# Patient Record
Sex: FEMALE | Race: WHITE | NOT HISPANIC OR LATINO | Employment: FULL TIME | URBAN - METROPOLITAN AREA
[De-identification: names, ages, dates, MRNs, and addresses within clinical notes are randomized per-mention and may not be internally consistent; named-entity substitution may affect disease eponyms.]

---

## 2022-11-11 ENCOUNTER — TELEPHONE (OUTPATIENT)
Dept: GASTROENTEROLOGY | Facility: CLINIC | Age: 42
End: 2022-11-11
Payer: COMMERCIAL

## 2022-11-11 NOTE — TELEPHONE ENCOUNTER
Called patient to let know that the upcoming appointment scheduled with our Nurse Practioner for ulcerative colitis, Crohn's, she is not able to treat, [facility] specialist Dr Gonsalves is presently not able to take new patients, so I left message to refer her to [facility]/ Dr Toribio. Number provided.

## 2022-11-18 ENCOUNTER — TELEPHONE (OUTPATIENT)
Dept: ENDOSCOPY | Facility: HOSPITAL | Age: 42
End: 2022-11-18
Payer: COMMERCIAL

## 2022-11-18 NOTE — TELEPHONE ENCOUNTER
Pt calling to scheduled an appointment with IBD for UC and wants a call back   Pt called ENDO scheduling department   Please call pt

## 2023-09-07 ENCOUNTER — TELEPHONE (OUTPATIENT)
Dept: OBSTETRICS AND GYNECOLOGY | Facility: CLINIC | Age: 43
End: 2023-09-07
Payer: COMMERCIAL

## 2023-09-07 DIAGNOSIS — Z12.31 ENCOUNTER FOR SCREENING MAMMOGRAM FOR MALIGNANT NEOPLASM OF BREAST: Primary | ICD-10-CM

## 2023-09-07 NOTE — TELEPHONE ENCOUNTER
Called patient and she is booked to see LL on 9/21.  Mammo ordered but could not schedule 9/21.  Patient will call back to schedule mammo.

## 2023-09-07 NOTE — TELEPHONE ENCOUNTER
----- Message from Padmini Hooker sent at 9/7/2023  8:42 AM CDT -----  Type:  Mammogram    Caller is requesting to schedule their annual mammogram appointment.  Order is not listed in EPIC.  Please enter order and contact patient to schedule.  Name of Caller:Portal Message --- New Patient   Where would they like the mammogram performed?  Would the patient rather a call back or a response via [facility]? Call back   Best Call Back Number:[phone]  Additional Information:       Message  Appointment Request From: Jeannie Dinero  With Provider: Mammogram  Preferred Date Range: Any date 9/21/2023 or later  Preferred Times: Any Time  Reason for visit: Annual mammogram needed    Comments:  Annual mammogram performed

## 2023-09-21 ENCOUNTER — OFFICE VISIT (OUTPATIENT)
Dept: OBSTETRICS AND GYNECOLOGY | Facility: CLINIC | Age: 43
End: 2023-09-21
Payer: COMMERCIAL

## 2023-09-21 ENCOUNTER — HOSPITAL ENCOUNTER (OUTPATIENT)
Dept: RADIOLOGY | Facility: HOSPITAL | Age: 43
Discharge: HOME | End: 2023-09-21
Attending: OBSTETRICS & GYNECOLOGY
Payer: COMMERCIAL

## 2023-09-21 VITALS
SYSTOLIC BLOOD PRESSURE: 116 MMHG | DIASTOLIC BLOOD PRESSURE: 86 MMHG | WEIGHT: 201.63 LBS | BODY MASS INDEX: 34.42 KG/M2 | HEIGHT: 64 IN

## 2023-09-21 DIAGNOSIS — N92.0 MENORRHAGIA WITH REGULAR CYCLE: ICD-10-CM

## 2023-09-21 DIAGNOSIS — Z12.4 SCREENING FOR CERVICAL CANCER: ICD-10-CM

## 2023-09-21 DIAGNOSIS — Z12.31 ENCOUNTER FOR SCREENING MAMMOGRAM FOR MALIGNANT NEOPLASM OF BREAST: ICD-10-CM

## 2023-09-21 DIAGNOSIS — Z12.31 SCREENING MAMMOGRAM, ENCOUNTER FOR: ICD-10-CM

## 2023-09-21 DIAGNOSIS — Z01.419 ENCOUNTER FOR GYNECOLOGICAL EXAMINATION (GENERAL) (ROUTINE) WITHOUT ABNORMAL FINDINGS: Primary | ICD-10-CM

## 2023-09-21 DIAGNOSIS — N94.6 DYSMENORRHEA: ICD-10-CM

## 2023-09-21 PROCEDURE — 3074F PR MOST RECENT SYSTOLIC BLOOD PRESSURE < 130 MM HG: ICD-10-PCS | Mod: CPTII,S$GLB,, | Performed by: OBSTETRICS & GYNECOLOGY

## 2023-09-21 PROCEDURE — 99999 PR PBB SHADOW E&M-EST. PATIENT-LVL III: ICD-10-PCS | Mod: PBBFAC,,, | Performed by: OBSTETRICS & GYNECOLOGY

## 2023-09-21 PROCEDURE — 4010F ACE/ARB THERAPY RXD/TAKEN: CPT | Mod: CPTII,S$GLB,, | Performed by: OBSTETRICS & GYNECOLOGY

## 2023-09-21 PROCEDURE — 77067 SCR MAMMO BI INCL CAD: CPT | Mod: 26,,, | Performed by: RADIOLOGY

## 2023-09-21 PROCEDURE — 3008F BODY MASS INDEX DOCD: CPT | Mod: CPTII,S$GLB,, | Performed by: OBSTETRICS & GYNECOLOGY

## 2023-09-21 PROCEDURE — 77063 MAMMO DIGITAL SCREENING BILAT WITH TOMO: ICD-10-PCS | Mod: 26,,, | Performed by: RADIOLOGY

## 2023-09-21 PROCEDURE — 76830 US PELVIS COMP WITH TRANSVAG NON-OB (XPD): ICD-10-PCS | Mod: 26,,, | Performed by: RADIOLOGY

## 2023-09-21 PROCEDURE — 3008F PR BODY MASS INDEX (BMI) DOCUMENTED: ICD-10-PCS | Mod: CPTII,S$GLB,, | Performed by: OBSTETRICS & GYNECOLOGY

## 2023-09-21 PROCEDURE — 87624 HPV HI-RISK TYP POOLED RSLT: CPT | Performed by: OBSTETRICS & GYNECOLOGY

## 2023-09-21 PROCEDURE — 1159F MED LIST DOCD IN RCRD: CPT | Mod: CPTII,S$GLB,, | Performed by: OBSTETRICS & GYNECOLOGY

## 2023-09-21 PROCEDURE — 99999 PR PBB SHADOW E&M-EST. PATIENT-LVL III: CPT | Mod: PBBFAC,,, | Performed by: OBSTETRICS & GYNECOLOGY

## 2023-09-21 PROCEDURE — 76856 US EXAM PELVIC COMPLETE: CPT | Mod: 26,,, | Performed by: RADIOLOGY

## 2023-09-21 PROCEDURE — 76856 US PELVIS COMP WITH TRANSVAG NON-OB (XPD): ICD-10-PCS | Mod: 26,,, | Performed by: RADIOLOGY

## 2023-09-21 PROCEDURE — 88175 CYTOPATH C/V AUTO FLUID REDO: CPT | Performed by: OBSTETRICS & GYNECOLOGY

## 2023-09-21 PROCEDURE — 76830 TRANSVAGINAL US NON-OB: CPT | Mod: TC,PO

## 2023-09-21 PROCEDURE — 3079F DIAST BP 80-89 MM HG: CPT | Mod: CPTII,S$GLB,, | Performed by: OBSTETRICS & GYNECOLOGY

## 2023-09-21 PROCEDURE — 99396 PR PREVENTIVE VISIT,EST,40-64: ICD-10-PCS | Mod: S$GLB,,, | Performed by: OBSTETRICS & GYNECOLOGY

## 2023-09-21 PROCEDURE — 3074F SYST BP LT 130 MM HG: CPT | Mod: CPTII,S$GLB,, | Performed by: OBSTETRICS & GYNECOLOGY

## 2023-09-21 PROCEDURE — 76830 TRANSVAGINAL US NON-OB: CPT | Mod: 26,,, | Performed by: RADIOLOGY

## 2023-09-21 PROCEDURE — 1159F PR MEDICATION LIST DOCUMENTED IN MEDICAL RECORD: ICD-10-PCS | Mod: CPTII,S$GLB,, | Performed by: OBSTETRICS & GYNECOLOGY

## 2023-09-21 PROCEDURE — 99396 PREV VISIT EST AGE 40-64: CPT | Mod: S$GLB,,, | Performed by: OBSTETRICS & GYNECOLOGY

## 2023-09-21 PROCEDURE — 77067 MAMMO DIGITAL SCREENING BILAT WITH TOMO: ICD-10-PCS | Mod: 26,,, | Performed by: RADIOLOGY

## 2023-09-21 PROCEDURE — 77067 SCR MAMMO BI INCL CAD: CPT | Mod: TC,PO

## 2023-09-21 PROCEDURE — 3079F PR MOST RECENT DIASTOLIC BLOOD PRESSURE 80-89 MM HG: ICD-10-PCS | Mod: CPTII,S$GLB,, | Performed by: OBSTETRICS & GYNECOLOGY

## 2023-09-21 PROCEDURE — 4010F PR ACE/ARB THEARPY RXD/TAKEN: ICD-10-PCS | Mod: CPTII,S$GLB,, | Performed by: OBSTETRICS & GYNECOLOGY

## 2023-09-21 PROCEDURE — 77063 BREAST TOMOSYNTHESIS BI: CPT | Mod: 26,,, | Performed by: RADIOLOGY

## 2023-09-21 RX ORDER — BUSPIRONE HYDROCHLORIDE 7.5 MG/1
7.5 TABLET ORAL 2 TIMES DAILY
COMMUNITY
Start: 2023-06-26

## 2023-09-21 RX ORDER — LOSARTAN POTASSIUM 100 MG/1
100 TABLET ORAL
COMMUNITY
Start: 2023-08-22

## 2023-09-21 NOTE — PROGRESS NOTES
Subjective:       Patient ID: Jeannie Dienro is a 43 y.o. female.    Chief Complaint:  Well Woman      History of Present Illness  HPI  Annual Exam-Premenopausal  Patient presents for annual exam. The patient has no complaints today. The patient is sexually active. -tl for contraception; GYN screening history: last pap: was normal and patient does not recall when last pap was and last mammogram: approximate date last summer;  and was abnormal: had to have f/u sono/dx mammo and patient does not recall when last mammogram was done. The patient wears seatbelts: yes. The patient participates in regular exercise: yes.--walks 5d/wk;  Has the patient ever been transfused or tattooed?: no. The patient reports that there is not domestic violence in her life.  Menses monthly, flow 8 days; passing large clots--stringy ; overnight pad; change q 3-4 hrs for first 3 days; +dysmenorrhea--can only use tylenol; previously tried on ocp (cannot recall name) but no improvement of menstrual flow--dysmenorrhea improved;     No prob sleeping  Denies urinary leakage    GYN & OB History--  Patient's last menstrual period was 2023 (exact date).   Date of Last Pap: No result found    OB History    Para Term  AB Living   3 3 3     3   SAB IAB Ectopic Multiple Live Births           3      # Outcome Date GA Lbr August/2nd Weight Sex Delivery Anes PTL Lv   3 Term      CS-LTranv   BERLIN   2 Term      CS-LTranv   BERLIN   1 Term      CS-LTranv   BERLIN       Review of Systems  Review of Systems   Genitourinary:  Positive for dysmenorrhea, menorrhagia and menstrual problem.   All other systems reviewed and are negative.          Objective:      Physical Exam:   Constitutional: She is oriented to person, place, and time. She appears well-developed and well-nourished.     Eyes: Pupils are equal, round, and reactive to light. Conjunctivae and EOM are normal.      Pulmonary/Chest: Effort normal. Right breast exhibits no mass, no nipple  discharge, no skin change and no tenderness. Left breast exhibits no mass, no nipple discharge, no skin change and no tenderness. Breasts are symmetrical.        Abdominal: Soft.     Genitourinary:    Vagina, right adnexa, left adnexa and rectum normal.      Pelvic exam was performed with patient supine.   The external female genitalia was normal.   Labial bartholins normal.Cervix is normal. Right adnexum displays no mass and no tenderness. Left adnexum displays no mass and no tenderness. No erythema,  no vaginal discharge, bleeding, rectocele, cystocele or unspecified prolapse of vaginal walls in the vagina. Vagina was moist.   pap smear completedUterus is enlarged. Uterus size: 10 cm.Normal urethral meatus.Urethra findings: no urethral massBladder findings: no bladder distention and no bladder tenderness          Musculoskeletal: Normal range of motion and moves all extremeties.       Neurological: She is alert and oriented to person, place, and time.    Skin: Skin is warm.    Psychiatric: She has a normal mood and affect. Her behavior is normal.           Assessment:        1. Encounter for gynecological examination (general) (routine) without abnormal findings    2. Screening for cervical cancer    3. Screening mammogram, encounter for    4. Menorrhagia with regular cycle    5. Dysmenorrhea               Plan:      Continue annual well woman exam.  Pap today; Reviewed updated recommendations for pap smears (every 3 years) in low risk patients.   Recommend annual pelvic exams.  Reviewed recommendations for annual CBE.  mammo ordered, continue yearly until age 75   Pelvic sono to al ut for etiol of menorrhagia/dysmenorrhea  If uterus wnl--consider 3 mo ocp, depo, mirena, hysterectomy  Encourage diet, exercise, weight loss   Continue menstrual calendar

## 2023-09-22 ENCOUNTER — PATIENT MESSAGE (OUTPATIENT)
Dept: OBSTETRICS AND GYNECOLOGY | Facility: CLINIC | Age: 43
End: 2023-09-22
Payer: COMMERCIAL

## 2023-09-22 NOTE — PROGRESS NOTES
the pelvic sono results are available for review.  The uterus has multiple fibroids --measuring up 2.3 cm; the images of the  lining shows an abnormally thick irregular shaped tissue.    Both ovaries are normal in size    Let me know your thoughts

## 2023-09-22 NOTE — PROGRESS NOTES
I am happy to report that your recent breast imaging did NOT show evidence of cancer. An annual mammogram is the best test to screen for breast cancer, but it is not perfect, and it can miss some cancers. So, even though your mammogram was normal, if you notice any lump or change in one of your breasts, please schedule an appointment with me for a proper evaluation. Thank you for letting me care for you. I look forward to seeing you again. Sincerely, Dr. Mickie Rahman

## 2023-09-28 LAB
FINAL PATHOLOGIC DIAGNOSIS: NORMAL
HPV HR 12 DNA SPEC QL NAA+PROBE: NEGATIVE
HPV16 AG SPEC QL: NEGATIVE
HPV18 DNA SPEC QL NAA+PROBE: NEGATIVE
Lab: NORMAL

## 2023-09-28 NOTE — PROGRESS NOTES
Good News! Your pap smear came back and it was normal.  The HPV is also negative.   I still recommend doing a pelvic exam and annual visit every year, but you only need the pap test every 3 years. Please call me if you have any further questions.   Sincerely,   Dr. Rahman

## 2023-10-09 ENCOUNTER — TELEPHONE (OUTPATIENT)
Dept: OBSTETRICS AND GYNECOLOGY | Facility: CLINIC | Age: 43
End: 2023-10-09
Payer: COMMERCIAL

## 2023-10-09 NOTE — TELEPHONE ENCOUNTER
Please advise the previous sono from [facility] also showed she had a possible endometrial polyp    Should possibly consider hysteroscopy with myosure    Can minerva preop appt

## 2024-02-20 ENCOUNTER — HOSPITAL ENCOUNTER (OUTPATIENT)
Dept: RADIOLOGY | Facility: HOSPITAL | Age: 44
Discharge: HOME | End: 2024-02-20
Attending: NURSE PRACTITIONER
Payer: COMMERCIAL

## 2024-02-20 ENCOUNTER — OFFICE VISIT (OUTPATIENT)
Dept: PRIMARY CARE CLINIC | Facility: CLINIC | Age: 44
End: 2024-02-20
Payer: COMMERCIAL

## 2024-02-20 VITALS
OXYGEN SATURATION: 100 % | WEIGHT: 206 LBS | HEIGHT: 64 IN | SYSTOLIC BLOOD PRESSURE: 134 MMHG | BODY MASS INDEX: 35.17 KG/M2 | DIASTOLIC BLOOD PRESSURE: 86 MMHG | HEART RATE: 63 BPM

## 2024-02-20 DIAGNOSIS — R09.89 CHEST CONGESTION: ICD-10-CM

## 2024-02-20 DIAGNOSIS — J06.9 ACUTE UPPER RESPIRATORY INFECTION: Primary | ICD-10-CM

## 2024-02-20 PROCEDURE — 4010F ACE/ARB THERAPY RXD/TAKEN: CPT | Mod: CPTII,S$GLB,, | Performed by: NURSE PRACTITIONER

## 2024-02-20 PROCEDURE — 3008F BODY MASS INDEX DOCD: CPT | Mod: CPTII,S$GLB,, | Performed by: NURSE PRACTITIONER

## 2024-02-20 PROCEDURE — 99999 PR PBB SHADOW E&M-EST. PATIENT-LVL IV: CPT | Mod: PBBFAC,,, | Performed by: NURSE PRACTITIONER

## 2024-02-20 PROCEDURE — 3075F SYST BP GE 130 - 139MM HG: CPT | Mod: CPTII,S$GLB,, | Performed by: NURSE PRACTITIONER

## 2024-02-20 PROCEDURE — 1159F MED LIST DOCD IN RCRD: CPT | Mod: CPTII,S$GLB,, | Performed by: NURSE PRACTITIONER

## 2024-02-20 PROCEDURE — 3079F DIAST BP 80-89 MM HG: CPT | Mod: CPTII,S$GLB,, | Performed by: NURSE PRACTITIONER

## 2024-02-20 PROCEDURE — 71046 X-RAY EXAM CHEST 2 VIEWS: CPT | Mod: 26,,, | Performed by: RADIOLOGY

## 2024-02-20 PROCEDURE — 71046 X-RAY EXAM CHEST 2 VIEWS: CPT | Mod: TC,PO

## 2024-02-20 PROCEDURE — 99203 OFFICE O/P NEW LOW 30 MIN: CPT | Mod: S$GLB,,, | Performed by: NURSE PRACTITIONER

## 2024-02-20 RX ORDER — PREDNISONE 20 MG/1
40 TABLET ORAL DAILY
Qty: 10 TABLET | Refills: 0 | Status: SHIPPED | OUTPATIENT
Start: 2024-02-20 | End: 2024-02-25

## 2024-02-20 RX ORDER — FLUTICASONE PROPIONATE 50 MCG
1 SPRAY, SUSPENSION (ML) NASAL DAILY
Qty: 18.2 ML | Refills: 0 | Status: SHIPPED | OUTPATIENT
Start: 2024-02-20 | End: 2024-04-18 | Stop reason: ALTCHOICE

## 2024-02-20 RX ORDER — HYDROCHLOROTHIAZIDE 12.5 MG/1
12.5 TABLET ORAL DAILY
COMMUNITY
Start: 2024-01-22

## 2024-02-20 RX ORDER — AMOXICILLIN AND CLAVULANATE POTASSIUM 875; 125 MG/1; MG/1
1 TABLET, FILM COATED ORAL 2 TIMES DAILY
Qty: 14 TABLET | Refills: 0 | Status: SHIPPED | OUTPATIENT
Start: 2024-02-20 | End: 2024-02-27

## 2024-02-20 RX ORDER — ALBUTEROL SULFATE 90 UG/1
2 AEROSOL, METERED RESPIRATORY (INHALATION) EVERY 6 HOURS PRN
Qty: 18 G | Refills: 0 | Status: SHIPPED | OUTPATIENT
Start: 2024-02-20 | End: 2024-04-18 | Stop reason: ALTCHOICE

## 2024-02-20 NOTE — PROGRESS NOTES
Assessment/Plan:    Presentation consistent with URI. Continue symptom management with antibiotics, decongestants, and nasal steroids. Increase fluid intake. Follow up in several days if symptoms not improved.    Orders this visit:    Acute upper respiratory infection  -     amoxicillin-clavulanate 875-125mg (AUGMENTIN) 875-125 mg per tablet; Take 1 tablet by mouth 2 (two) times daily. for 7 days  Dispense: 14 tablet; Refill: 0  -     predniSONE (DELTASONE) 20 MG tablet; Take 2 tablets (40 mg total) by mouth once daily. for 5 days  Dispense: 10 tablet; Refill: 0  -     fluticasone propionate (FLONASE) 50 mcg/actuation nasal spray; 1 spray (50 mcg total) by Each Nostril route once daily.  Dispense: 18.2 mL; Refill: 0  -     albuterol (PROVENTIL/VENTOLIN HFA) 90 mcg/actuation inhaler; Inhale 2 puffs into the lungs every 6 (six) hours as needed for Wheezing or Shortness of Breath.  Dispense: 18 g; Refill: 0    Chest congestion  -     X-Ray Chest PA And Lateral; Future; Expected date: 02/20/2024      Follow up if symptoms worsen or fail to improve.    Jennifer Whalen NP     _____________________________________________________________________________________________________________________________________________________    HPI:    Patient is in clinic today as an established patient with a new complaint of cold-like symptoms. Symptoms been present for 12 day(s).  The patient reports that there has been congestion, sore throat, dry cough, and shortness of breath but there has not been any sneezing, myalgias, headache, fever, chills, and white spots in throat.  The patient has treated this with mucinex, OTC cough/cold product of patient's choice PRN and fluids and rest without relief. Patient reports she felt like her throat was closing yesterday and started to vomit. Symptoms resolved a few minutes later. Denies any difficulty swallowing.     Past Medical History:  Past Medical History:   Diagnosis Date    Hypertension  "     Past Surgical History:   Procedure Laterality Date     SECTION      x2     SECTION WITH TUBAL LIGATION      DILATION AND CURETTAGE OF UTERUS      polyps     Review of patient's allergies indicates:  No Known Allergies  Social History     Tobacco Use    Smoking status: Never    Smokeless tobacco: Never   Substance Use Topics    Alcohol use: Not Currently    Drug use: Never     Family History   Problem Relation Age of Onset    Breast cancer Paternal Grandmother      Current Outpatient Medications on File Prior to Visit   Medication Sig Dispense Refill    busPIRone (BUSPAR) 7.5 MG tablet Take 7.5 mg by mouth 2 (two) times daily.      hydroCHLOROthiazide (HYDRODIURIL) 12.5 MG Tab Take 12.5 mg by mouth once daily.      losartan (COZAAR) 100 MG tablet Take 100 mg by mouth.       No current facility-administered medications on file prior to visit.       Review of Systems   Constitutional: Negative.    HENT:  Positive for congestion (chest) and sore throat.    Eyes: Negative.    Respiratory:  Positive for cough and shortness of breath.    Cardiovascular: Negative.    Gastrointestinal:  Positive for vomiting.   Endocrine: Negative.    Genitourinary: Negative.    Musculoskeletal: Negative.    Skin: Negative.    Allergic/Immunologic: Negative.    Neurological: Negative.    Hematological: Negative.    Psychiatric/Behavioral: Negative.         Vitals:    24 1307   BP: 134/86   Pulse: 63   SpO2: 100%   Weight: 93.5 kg (206 lb 0.3 oz)   Height: 5' 4" (1.626 m)       Wt Readings from Last 3 Encounters:   24 93.5 kg (206 lb 0.3 oz)   23 91.4 kg (201 lb 9.8 oz)       Physical Exam  Vitals and nursing note reviewed.   Constitutional:       Appearance: She is well-developed.   HENT:      Head: Normocephalic and atraumatic.   Eyes:      Conjunctiva/sclera: Conjunctivae normal.      Pupils: Pupils are equal, round, and reactive to light.   Cardiovascular:      Rate and Rhythm: Normal rate and " regular rhythm.      Heart sounds: Normal heart sounds.   Pulmonary:      Effort: Pulmonary effort is normal.      Breath sounds: Examination of the right-middle field reveals decreased breath sounds. Examination of the left-middle field reveals decreased breath sounds. Examination of the right-lower field reveals decreased breath sounds. Examination of the left-lower field reveals decreased breath sounds. Decreased breath sounds present.   Abdominal:      General: Bowel sounds are normal.      Palpations: Abdomen is soft.   Musculoskeletal:         General: Normal range of motion.      Cervical back: Normal range of motion and neck supple.   Skin:     General: Skin is warm and dry.   Neurological:      Mental Status: She is alert and oriented to person, place, and time.   Psychiatric:         Behavior: Behavior normal.         Thought Content: Thought content normal.         Judgment: Judgment normal.

## 2024-03-13 ENCOUNTER — TELEPHONE (OUTPATIENT)
Dept: ORTHOPEDICS | Facility: CLINIC | Age: 44
End: 2024-03-13
Payer: COMMERCIAL

## 2024-03-13 DIAGNOSIS — M25.551 RIGHT HIP PAIN: Primary | ICD-10-CM

## 2024-03-15 ENCOUNTER — OFFICE VISIT (OUTPATIENT)
Dept: ORTHOPEDICS | Facility: CLINIC | Age: 44
End: 2024-03-15
Payer: COMMERCIAL

## 2024-03-15 ENCOUNTER — HOSPITAL ENCOUNTER (OUTPATIENT)
Dept: RADIOLOGY | Facility: HOSPITAL | Age: 44
Discharge: HOME | End: 2024-03-15
Attending: STUDENT IN AN ORGANIZED HEALTH CARE EDUCATION/TRAINING PROGRAM
Payer: COMMERCIAL

## 2024-03-15 VITALS — WEIGHT: 206 LBS | BODY MASS INDEX: 35.17 KG/M2 | HEIGHT: 64 IN

## 2024-03-15 DIAGNOSIS — M25.559 GREATER TROCHANTERIC PAIN SYNDROME: Primary | ICD-10-CM

## 2024-03-15 DIAGNOSIS — M25.551 RIGHT HIP PAIN: ICD-10-CM

## 2024-03-15 PROCEDURE — 99999 PR PBB SHADOW E&M-EST. PATIENT-LVL III: CPT | Mod: PBBFAC,,, | Performed by: STUDENT IN AN ORGANIZED HEALTH CARE EDUCATION/TRAINING PROGRAM

## 2024-03-15 PROCEDURE — 73503 X-RAY EXAM HIP UNI 4/> VIEWS: CPT | Mod: TC,PO,RT

## 2024-03-15 PROCEDURE — 1160F RVW MEDS BY RX/DR IN RCRD: CPT | Mod: CPTII,S$GLB,, | Performed by: STUDENT IN AN ORGANIZED HEALTH CARE EDUCATION/TRAINING PROGRAM

## 2024-03-15 PROCEDURE — 3008F BODY MASS INDEX DOCD: CPT | Mod: CPTII,S$GLB,, | Performed by: STUDENT IN AN ORGANIZED HEALTH CARE EDUCATION/TRAINING PROGRAM

## 2024-03-15 PROCEDURE — 99204 OFFICE O/P NEW MOD 45 MIN: CPT | Mod: S$GLB,,, | Performed by: STUDENT IN AN ORGANIZED HEALTH CARE EDUCATION/TRAINING PROGRAM

## 2024-03-15 PROCEDURE — 1159F MED LIST DOCD IN RCRD: CPT | Mod: CPTII,S$GLB,, | Performed by: STUDENT IN AN ORGANIZED HEALTH CARE EDUCATION/TRAINING PROGRAM

## 2024-03-15 PROCEDURE — 73503 X-RAY EXAM HIP UNI 4/> VIEWS: CPT | Mod: 26,RT,, | Performed by: RADIOLOGY

## 2024-03-15 PROCEDURE — 4010F ACE/ARB THERAPY RXD/TAKEN: CPT | Mod: CPTII,S$GLB,, | Performed by: STUDENT IN AN ORGANIZED HEALTH CARE EDUCATION/TRAINING PROGRAM

## 2024-03-15 RX ORDER — NITROFURANTOIN 25; 75 MG/1; MG/1
100 CAPSULE ORAL 2 TIMES DAILY
COMMUNITY
Start: 2024-03-08 | End: 2024-04-08

## 2024-03-15 NOTE — PROGRESS NOTES
Patient ID: Jeannie Dinero  YOB: 1980  MRN: [id number]    Chief Complaint: Pain of the Right Hip      Referred By: Self for Right Hip Pain    History of Present Illness: Jeannie Dinero is a right-hand dominant 43 y.o. female who presents today with right hip pain.  Patient reports falling 2 years ago and feeling a pop and tightness in her right hip.  She reports no pain at the time but reports that minimal pain started approximately a month following the fall and would come and go  through the years.  She states that 3 months ago, she developed a constant, shooting pain, with locking,  in her lateral hip that is mild in the morning, but by the end of the day, patient is unable to bend over or squat.  She rates her pain today at a 3/10 but reports by the end of the day the pain will be at a 6-7/10.  Reports that she uses OTC Ibuprofen, heat and stretching to help with symptoms.     The patient is active in none.  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 /  / [facility]      Past Medical History:   Past Medical History:   Diagnosis Date    Hypertension      Past Surgical History:   Procedure Laterality Date     SECTION      x2     SECTION WITH TUBAL LIGATION      DILATION AND CURETTAGE OF UTERUS      polyps     Family History   Problem Relation Age of Onset    Breast cancer Paternal Grandmother      Social History     Socioeconomic History    Marital status:    Tobacco Use    Smoking status: Never    Smokeless tobacco: Never   Substance and Sexual Activity    Alcohol use: Not Currently    Drug use: Never    Sexual activity: Yes     Social Determinants of Health     Financial Resource Strain: Low Risk  (2024)    Overall Financial Resource Strain (CARDIA)     Difficulty of Paying Living Expenses: Not hard at all   Food Insecurity: No Food Insecurity (2024)    Hunger Vital Sign     Worried About Running Out of Food in the Last Year: Never true     Ran Out of Food in  the Last Year: Never true   Transportation Needs: No Transportation Needs (2/20/2024)    PRAPARE - Transportation     Lack of Transportation (Medical): No     Lack of Transportation (Non-Medical): No   Physical Activity: Insufficiently Active (2/20/2024)    Exercise Vital Sign     Days of Exercise per Week: 3 days     Minutes of Exercise per Session: 30 min   Stress: Stress Concern Present (2/20/2024)    [place] [employer] - Occupational Stress Questionnaire     Feeling of Stress : To some extent   Social Connections: Unknown (2/20/2024)    Social Connection and Isolation Panel [NHANES]     Frequency of Communication with Friends and Family: More than three times a week     Frequency of Social Gatherings with Friends and Family: Twice a week     Active Member of Clubs or Organizations: No     Attends Club or Organization Meetings: Never     Marital Status:    Housing Stability: Low Risk  (2/20/2024)    Housing Stability Vital Sign     Unable to Pay for Housing in the Last Year: No     Number of Places Lived in the Last Year: 1     Unstable Housing in the Last Year: No     Medication List with Changes/Refills   Current Medications    ALBUTEROL (PROVENTIL/VENTOLIN HFA) 90 MCG/ACTUATION INHALER    Inhale 2 puffs into the lungs every 6 (six) hours as needed for Wheezing or Shortness of Breath.    BUSPIRONE (BUSPAR) 7.5 MG TABLET    Take 7.5 mg by mouth 2 (two) times daily.    FLUTICASONE PROPIONATE (FLONASE) 50 MCG/ACTUATION NASAL SPRAY    1 spray (50 mcg total) by Each Nostril route once daily.    HYDROCHLOROTHIAZIDE (HYDRODIURIL) 12.5 MG TAB    Take 12.5 mg by mouth once daily.    LOSARTAN (COZAAR) 100 MG TABLET    Take 100 mg by mouth.    NITROFURANTOIN, MACROCRYSTAL-MONOHYDRATE, (MACROBID) 100 MG CAPSULE    Take 100 mg by mouth 2 (two) times daily.     Review of patient's allergies indicates:  No Known Allergies    Physical Exam:   Body mass index is 35.36 kg/m².    GENERAL: Well  appearing, in no acute distress.  HEAD: Normocephalic and atraumatic.  ENT: External ears and nose grossly normal.  EYES: EOMI bilaterally  PULMONARY: Respirations are grossly even and non-labored.  NEURO: Awake, alert, and oriented x 3.  SKIN: No obvious rashes appreciated.  PSYCH: Mood & affect are appropriate.    Detailed MSK exam:     Right hip exam:  -ROM: internal rotation 30, external rotation 60  -MIGUELANGEL negative, FADIR negative, Ricardo negative  -Muscle strength 5/5 flexion, 5/5 extension, 5/5 abduction, 5/5 adduction  -negative log roll  -negative straight leg raise  -TTP: greater trochanter        Imaging:  X-Ray Hip 4 or more views Right (with Pelvis when performed)  Narrative: EXAMINATION:  XR HIP WITH PELVIS WHEN PERFORMED, 4 OR MORE VIEWS RIGHT    CLINICAL HISTORY:  Pain in right hip    TECHNIQUE:  Two view pelvis with two view right hip    COMPARISON:  None    FINDINGS:  No acute osseous abnormality.  Hip joint spaces maintained.  Bilateral os acetabula noted.  No acute abnormality.  Impression: As above    Electronically signed by: Ildefonso Angelo MD  Date:    03/15/2024  Time:    08:58        Relevant imaging results were reviewed and interpreted by me and per my read shows no acute abnormalities.  This was discussed with the patient and / or family today.     Assessment:  Jeannie Dinero is a 43 y.o. female presenting with right hip pain.   History, physical and radiographs are consistent with a likely diagnosis of GTPS.   Plan: PT referral. Ice/heat, voltaren gel as needed. Consider steroid injection if not improving. Continue conservative management for pain.   Follow up 8 weeks. All questions answered.      Greater trochanteric pain syndrome             A copy of today's visit note has been sent to the referring provider.     Electronically signed:  Corwin Mejias MD, MPH  03/15/2024  10:45 AM

## 2024-03-15 NOTE — PATIENT INSTRUCTIONS
Assessment:  Jeannie Dinero is a 43 y.o. female   Chief Complaint   Patient presents with    Right Hip - Pain       Encounter Diagnosis   Name Primary?    Greater trochanteric pain syndrome Yes        Plan:  Referral sent to [facility]  Apply topical diclofenac (Voltaren) up to 4 times a day to the affected area.  It can be bought over the counter at any local pharmacy.    Lidocaine patches can be bought over the counter  Patient can rotate ice/ heat as needed    Follow-up: 8 weeks or sooner if there are problems between now and then.    Thank you for choosing [facility] and Dr. Corwin Mejias for your orthopedic & sports medicine care. It is our goal to provide you with exceptional care that will help keep you healthy, active, and get you back in the game.    Please do not hesitate to reach out to us via email, phone, or MyChart with any questions, concerns, or feedback.    If you felt that you received exemplary care today, please consider leaving us feedback on P[employer] Cards at:  [email or web address]    If you are experiencing pain/discomfort ,or have questions after 5pm and would like to be connected to the [facility] on-call team, please call this number and specify which Sports Medicine provider is treating you: [phone]

## 2024-04-08 ENCOUNTER — OFFICE VISIT (OUTPATIENT)
Dept: OBSTETRICS AND GYNECOLOGY | Facility: CLINIC | Age: 44
End: 2024-04-08
Payer: COMMERCIAL

## 2024-04-08 ENCOUNTER — LAB VISIT (OUTPATIENT)
Dept: LAB | Facility: HOSPITAL | Age: 44
End: 2024-04-08
Attending: OBSTETRICS & GYNECOLOGY
Payer: COMMERCIAL

## 2024-04-08 VITALS
DIASTOLIC BLOOD PRESSURE: 82 MMHG | WEIGHT: 204.69 LBS | BODY MASS INDEX: 35.14 KG/M2 | SYSTOLIC BLOOD PRESSURE: 112 MMHG

## 2024-04-08 DIAGNOSIS — N92.6 IRREGULAR MENSES: ICD-10-CM

## 2024-04-08 DIAGNOSIS — R30.0 DYSURIA: ICD-10-CM

## 2024-04-08 DIAGNOSIS — N92.6 IRREGULAR MENSES: Primary | ICD-10-CM

## 2024-04-08 LAB
B-HCG UR QL: NEGATIVE
CTP QC/QA: YES
T4 FREE SERPL-MCNC: 0.83 NG/DL (ref 0.71–1.51)
TSH SERPL DL<=0.005 MIU/L-ACNC: 2.04 UIU/ML (ref 0.4–4)

## 2024-04-08 PROCEDURE — 84443 ASSAY THYROID STIM HORMONE: CPT | Performed by: OBSTETRICS & GYNECOLOGY

## 2024-04-08 PROCEDURE — 99999 PR PBB SHADOW E&M-EST. PATIENT-LVL III: CPT | Mod: PBBFAC,,, | Performed by: OBSTETRICS & GYNECOLOGY

## 2024-04-08 PROCEDURE — 3074F SYST BP LT 130 MM HG: CPT | Mod: CPTII,S$GLB,, | Performed by: OBSTETRICS & GYNECOLOGY

## 2024-04-08 PROCEDURE — 87086 URINE CULTURE/COLONY COUNT: CPT | Performed by: OBSTETRICS & GYNECOLOGY

## 2024-04-08 PROCEDURE — 84439 ASSAY OF FREE THYROXINE: CPT | Performed by: OBSTETRICS & GYNECOLOGY

## 2024-04-08 PROCEDURE — 36415 COLL VENOUS BLD VENIPUNCTURE: CPT | Mod: PN | Performed by: OBSTETRICS & GYNECOLOGY

## 2024-04-08 PROCEDURE — 4010F ACE/ARB THERAPY RXD/TAKEN: CPT | Mod: CPTII,S$GLB,, | Performed by: OBSTETRICS & GYNECOLOGY

## 2024-04-08 PROCEDURE — 1159F MED LIST DOCD IN RCRD: CPT | Mod: CPTII,S$GLB,, | Performed by: OBSTETRICS & GYNECOLOGY

## 2024-04-08 PROCEDURE — 99213 OFFICE O/P EST LOW 20 MIN: CPT | Mod: S$GLB,,, | Performed by: OBSTETRICS & GYNECOLOGY

## 2024-04-08 PROCEDURE — 3008F BODY MASS INDEX DOCD: CPT | Mod: CPTII,S$GLB,, | Performed by: OBSTETRICS & GYNECOLOGY

## 2024-04-08 PROCEDURE — 86376 MICROSOMAL ANTIBODY EACH: CPT | Performed by: OBSTETRICS & GYNECOLOGY

## 2024-04-08 PROCEDURE — 3079F DIAST BP 80-89 MM HG: CPT | Mod: CPTII,S$GLB,, | Performed by: OBSTETRICS & GYNECOLOGY

## 2024-04-08 PROCEDURE — 81025 URINE PREGNANCY TEST: CPT | Mod: S$GLB,,, | Performed by: OBSTETRICS & GYNECOLOGY

## 2024-04-08 RX ORDER — VEDOLIZUMAB 108 MG/.68ML
INJECTION, SOLUTION SUBCUTANEOUS
COMMUNITY
Start: 2024-03-30

## 2024-04-08 NOTE — PROGRESS NOTES
Subjective:       Patient ID: Jeannie Dinero is a 43 y.o. female.    Chief Complaint:  Dysmenorrhea and Dysuria      History of Present Illness  HPI  Here f/o irreg bleeding  Also c/o dysuria--reports finishing course of macrobid    Reports menses coming 2x/monhth  Flow , ,   Feels cycle length has not changed  Cycle length 22, 27 and 29 d    Usual partner; has tl for contraception  Denies pain with intercourse    GYN & OB History  Patient's last menstrual period was 2024 (exact date).   Date of Last Pap: 2023    OB History    Para Term  AB Living   3 3 3 0 0 3   SAB IAB Ectopic Multiple Live Births   0 0 0   3      # Outcome Date GA Lbr August/2nd Weight Sex Delivery Anes PTL Lv   3 Term      CS-LTranv   BERLIN   2 Term      CS-LTranv   BERLIN   1 Term      CS-LTranv   BERLIN       Review of Systems  Review of Systems   Genitourinary:  Positive for menstrual problem.   All other systems reviewed and are negative.          Objective:      Physical Exam:   Constitutional: She appears well-developed.     Eyes: Pupils are equal, round, and reactive to light. Conjunctivae and EOM are normal.      Pulmonary/Chest: Effort normal. Right breast exhibits no mass, no nipple discharge, no skin change, no tenderness and presence. Left breast exhibits no mass, no nipple discharge, no skin change, no tenderness and presence. Breasts are symmetrical.        Abdominal: Soft.     Genitourinary:    Vagina and uterus normal.      Pelvic exam was performed with patient supine.             Musculoskeletal: Normal range of motion.       Neurological: She is alert.    Skin: Skin is warm.    Psychiatric: She has a normal mood and affect.           Assessment:        1. Irregular menses    2. Dysuria               Plan:    Pap current  Pelvic sono to eval ut for etiol of abnl bleeding  Tsh today  If sono/labs wnl; can regulate cycle with ocp vs observation  Ww exam due after 2024

## 2024-04-09 LAB — THYROPEROXIDASE IGG SERPL-ACNC: 106.8 IU/ML

## 2024-04-10 LAB — BACTERIA UR CULT: NO GROWTH

## 2024-04-10 NOTE — PROGRESS NOTES
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  Try to avoid gluten, add a source of iodine, discuss with pcp

## 2024-04-11 ENCOUNTER — HOSPITAL ENCOUNTER (OUTPATIENT)
Dept: RADIOLOGY | Facility: HOSPITAL | Age: 44
Discharge: HOME | End: 2024-04-11
Attending: OBSTETRICS & GYNECOLOGY
Payer: COMMERCIAL

## 2024-04-11 DIAGNOSIS — N92.6 IRREGULAR MENSES: ICD-10-CM

## 2024-04-11 PROCEDURE — 76830 TRANSVAGINAL US NON-OB: CPT | Mod: 26,,, | Performed by: RADIOLOGY

## 2024-04-11 PROCEDURE — 76856 US EXAM PELVIC COMPLETE: CPT | Mod: 26,,, | Performed by: RADIOLOGY

## 2024-04-11 PROCEDURE — 76830 TRANSVAGINAL US NON-OB: CPT | Mod: TC,PO

## 2024-04-15 ENCOUNTER — PATIENT MESSAGE (OUTPATIENT)
Dept: OBSTETRICS AND GYNECOLOGY | Facility: CLINIC | Age: 44
End: 2024-04-15
Payer: COMMERCIAL

## 2024-04-16 ENCOUNTER — PATIENT MESSAGE (OUTPATIENT)
Dept: OBSTETRICS AND GYNECOLOGY | Facility: CLINIC | Age: 44
End: 2024-04-16
Payer: COMMERCIAL

## 2024-04-16 NOTE — PROGRESS NOTES
The pelvic sono results are available for review.  The uterus is enlarged and contains at least 2 fibroids approx 3 cm.  There may also be a fibroid located in the endometrial cavity.  The right ovary has a normal cyst and is normal in size.  The left ovary  is also normal in size.    Let me know your thoughts

## 2024-04-18 ENCOUNTER — OFFICE VISIT (OUTPATIENT)
Dept: OBSTETRICS AND GYNECOLOGY | Facility: CLINIC | Age: 44
End: 2024-04-18
Payer: COMMERCIAL

## 2024-04-18 DIAGNOSIS — N92.1 MENOMETRORRHAGIA: Primary | ICD-10-CM

## 2024-04-18 DIAGNOSIS — D21.9 FIBROIDS: ICD-10-CM

## 2024-04-18 PROCEDURE — 99213 OFFICE O/P EST LOW 20 MIN: CPT | Mod: 95,,, | Performed by: OBSTETRICS & GYNECOLOGY

## 2024-04-18 PROCEDURE — 4010F ACE/ARB THERAPY RXD/TAKEN: CPT | Mod: CPTII,95,, | Performed by: OBSTETRICS & GYNECOLOGY

## 2024-04-18 NOTE — PROGRESS NOTES
Subjective:       Patient ID: Jeannie Dinero is a 43 y.o. female.    Chief Complaint:  No chief complaint on file.    The patient location is: [place], [place], [place]  The chief complaint leading to consultation is: follow up    Visit type: audiovisual    Face to Face time with patient: 20  35 minutes of total time spent on the encounter, which includes face to face time and non-face to face time preparing to see the patient (eg, review of tests), Obtaining and/or reviewing separately obtained history, Documenting clinical information in the electronic or other health record, Independently interpreting results (not separately reported) and communicating results to the patient/family/caregiver, or Care coordination (not separately reported).         Each patient to whom he or she provides medical services by telemedicine is:  (1) informed of the relationship between the physician and patient and the respective role of any other health care provider with respect to management of the patient; and (2) notified that he or she may decline to receive medical services by telemedicine and may withdraw from such care at any time.    Notes:    History of Present Illness  HPI  Pt here for follow up  Reports irreg and heavy cycles  Now anemic    Reports menses coming 2x/monhth  Flow , ,   Feels cycle length has not changed  Cycle length 22, 27 and 29 d  GYN & OB History  Patient's last menstrual period was 2024 (exact date).   Date of Last Pap: 2023    OB History    Para Term  AB Living   3 3 3 0 0 3   SAB IAB Ectopic Multiple Live Births   0 0 0   3      # Outcome Date GA Lbr August/2nd Weight Sex Type Anes PTL Lv   3 Term      CS-LTranv   BERLIN   2 Term      CS-LTranv   BERLIN   1 Term      CS-LTranv   BERLIN       Review of Systems  Review of Systems   Genitourinary:  Positive for menorrhagia and menstrual problem.   All other systems reviewed and are negative.          Objective:      Physical Exam:    Constitutional: She is oriented to person, place, and time. She appears well-developed and well-nourished. No distress.     Eyes: Conjunctivae are normal.      Pulmonary/Chest: Effort normal.                  Musculoskeletal: Normal range of motion.       Neurological: She is alert and oriented to person, place, and time.    Skin: She is not diaphoretic.    Psychiatric: She has a normal mood and affect. Her behavior is normal. Judgment and thought content normal.           Assessment:        1. Menometrorrhagia    2. Fibroids               Plan:    Sono report reviewed--14 wk size uterus, several small fibroids; thickened endometrium    Reviewed options to regulate menses--ocp, 3 mo ocp, depo provera, mirena, lysteda, ablation, hysterectomy  AVS info will be provided on ablation, hsyterectomy  F/u 2-3 wks to discuss surgical options  Reveiwed hysterectomy procedure  Reviewed ablation procedure

## 2024-04-19 ENCOUNTER — PATIENT MESSAGE (OUTPATIENT)
Dept: OBSTETRICS AND GYNECOLOGY | Facility: CLINIC | Age: 44
End: 2024-04-19
Payer: COMMERCIAL

## 2024-04-19 DIAGNOSIS — N92.0 MENORRHAGIA WITH REGULAR CYCLE: Primary | ICD-10-CM

## 2024-04-23 RX ORDER — NORETHINDRONE ACETATE AND ETHINYL ESTRADIOL 1MG-20(21)
1 KIT ORAL DAILY
Qty: 90 TABLET | Refills: 3 | Status: SHIPPED | OUTPATIENT
Start: 2024-04-23 | End: 2025-04-23

## 2025-03-08 ENCOUNTER — OFFICE VISIT (OUTPATIENT)
Dept: URGENT CARE | Facility: CLINIC | Age: 45
End: 2025-03-08
Payer: COMMERCIAL

## 2025-03-08 VITALS
BODY MASS INDEX: 34.83 KG/M2 | SYSTOLIC BLOOD PRESSURE: 131 MMHG | TEMPERATURE: 99 F | HEIGHT: 64 IN | RESPIRATION RATE: 16 BRPM | DIASTOLIC BLOOD PRESSURE: 88 MMHG | WEIGHT: 204 LBS | OXYGEN SATURATION: 99 % | HEART RATE: 88 BPM

## 2025-03-08 DIAGNOSIS — J11.1 FLU: Primary | ICD-10-CM

## 2025-03-08 DIAGNOSIS — R50.9 FEVER, UNSPECIFIED FEVER CAUSE: ICD-10-CM

## 2025-03-08 LAB
CTP QC/QA: YES
POC MOLECULAR INFLUENZA A AGN: POSITIVE
POC MOLECULAR INFLUENZA B AGN: NEGATIVE

## 2025-03-08 PROCEDURE — 99213 OFFICE O/P EST LOW 20 MIN: CPT | Mod: S$GLB,,, | Performed by: NURSE PRACTITIONER

## 2025-03-08 PROCEDURE — 87502 INFLUENZA DNA AMP PROBE: CPT | Mod: QW,S$GLB,, | Performed by: NURSE PRACTITIONER

## 2025-03-08 RX ORDER — OSELTAMIVIR PHOSPHATE 75 MG/1
75 CAPSULE ORAL 2 TIMES DAILY
Qty: 10 CAPSULE | Refills: 0 | Status: SHIPPED | OUTPATIENT
Start: 2025-03-08 | End: 2025-03-13

## 2025-03-08 NOTE — PROGRESS NOTES
"Subjective:      Patient ID: Jeannie Dinero is a 44 y.o. female.    Vitals:  height is 5' 4" (1.626 m) and weight is 92.5 kg (204 lb). Her oral temperature is 98.8 °F (37.1 °C). Her blood pressure is 131/88 and her pulse is 88. Her respiration is 16 and oxygen saturation is 99%.     Chief Complaint: Fever    Pt states back from cruise this week and started with fever of 101.9 and sinus congestion for 4 days also states rash on left arm popped up yesterday    Fever   This is a new problem. The current episode started 4-13 days. The problem has been unchanged. The maximum temperature noted was 100 to 100.9 F. The temperature was taken using an oral thermometer. Associated symptoms include congestion, coughing and a rash. She has tried acetaminophen for the symptoms. The treatment provided no relief. Her past medical history is significant for chronic illness.       Constitution: Positive for fever.   HENT:  Positive for congestion.    Respiratory:  Positive for cough.    Skin:  Positive for rash.      Objective:     Physical Exam   Constitutional: She is oriented to person, place, and time. She appears well-developed. She is cooperative.  Non-toxic appearance. She does not appear ill. No distress.   HENT:   Head: Normocephalic and atraumatic.   Ears:   Right Ear: Hearing, tympanic membrane, external ear and ear canal normal.   Left Ear: Hearing, tympanic membrane, external ear and ear canal normal.   Nose: Rhinorrhea present. No mucosal edema or nasal deformity. No epistaxis. Right sinus exhibits no maxillary sinus tenderness and no frontal sinus tenderness. Left sinus exhibits no maxillary sinus tenderness and no frontal sinus tenderness.   Mouth/Throat: Uvula is midline, oropharynx is clear and moist and mucous membranes are normal. No trismus in the jaw. Normal dentition. No uvula swelling. Cobblestoning present. No oropharyngeal exudate, posterior oropharyngeal edema or posterior oropharyngeal erythema.   Eyes: " Conjunctivae and lids are normal. No scleral icterus.   Neck: Trachea normal and phonation normal. Neck supple. No edema present. No erythema present. No neck rigidity present.   Cardiovascular: Normal rate, regular rhythm, normal heart sounds and normal pulses.   Pulmonary/Chest: Effort normal and breath sounds normal. No respiratory distress. She has no decreased breath sounds. She has no rhonchi.   Abdominal: Normal appearance and bowel sounds are normal. She exhibits no distension and no mass. Soft. There is no abdominal tenderness.   Musculoskeletal: Normal range of motion.         General: No deformity. Normal range of motion.   Neurological: She is alert and oriented to person, place, and time. She has normal strength. She exhibits normal muscle tone. Coordination normal.   Skin: Skin is warm, dry, intact, not diaphoretic and not pale.   Psychiatric: Her speech is normal and behavior is normal. Judgment and thought content normal.   Nursing note and vitals reviewed.      Assessment:     1. Flu    2. Fever, unspecified fever cause      .  Results for orders placed or performed in visit on 03/08/25   POCT Influenza A/B MOLECULAR    Collection Time: 03/08/25  9:23 AM   Result Value Ref Range    POC Molecular Influenza A Ag Positive (A) Negative    POC Molecular Influenza B Ag Negative Negative     Acceptable Yes        Plan:       Flu    Fever, unspecified fever cause  -     POCT Influenza A/B MOLECULAR  -     oseltamivir (TAMIFLU) 75 MG capsule; Take 1 capsule (75 mg total) by mouth 2 (two) times daily. for 5 days  Dispense: 10 capsule; Refill: 0    .INSTRUCTIONS:  - Rest.  - Drink plenty of fluids.  - Take Tylenol and/or Ibuprofen as directed as needed for fever/pain.  Do not take more than the recommended dose.  - follow up with your PCP within the next 1-2 weeks as needed.  - You must understand that you have received an Urgent Care treatment only and that you may be released before all of  your medical problems are known or treated.   - You, the patient, will arrange for follow up care as instructed.   - If your condition worsens or fails to improve we recommend that you receive another evaluation at the ER immediately or contact your PCP to discuss your concerns.   - You can call [phone] or [phone] to help schedule an appointment with the appropriate provider.     -If you smoke cigarettes, it would be beneficial for you to stop.    Monitor for new or worsening symptoms    OTC for symptom control    Recommend follow up with PCP/Pediatrician if symptoms are worsening

## 2025-03-12 ENCOUNTER — ON-DEMAND VIRTUAL (OUTPATIENT)
Dept: URGENT CARE | Facility: CLINIC | Age: 45
End: 2025-03-12
Payer: COMMERCIAL

## 2025-03-12 DIAGNOSIS — H92.01 OTALGIA OF RIGHT EAR: Primary | ICD-10-CM

## 2025-03-12 DIAGNOSIS — J32.9 SINUSITIS, UNSPECIFIED CHRONICITY, UNSPECIFIED LOCATION: ICD-10-CM

## 2025-03-12 RX ORDER — LORATADINE 10 MG/1
10 TABLET ORAL DAILY
Qty: 30 TABLET | Refills: 0 | Status: SHIPPED | OUTPATIENT
Start: 2025-03-12 | End: 2025-04-11

## 2025-03-12 RX ORDER — AMOXICILLIN AND CLAVULANATE POTASSIUM 875; 125 MG/1; MG/1
1 TABLET, FILM COATED ORAL EVERY 12 HOURS
Qty: 10 TABLET | Refills: 0 | Status: SHIPPED | OUTPATIENT
Start: 2025-03-12 | End: 2025-03-17

## 2025-03-12 NOTE — LETTER
March 12, 2025    Jeannie Dinero  [id number] [address] [address]             Virtual Visit - Urgent Care  Urgent Care  [address] [facility] [id number]   March 12, 2025     Patient: Jeannie Dinero   YOB: 1980   Date of Visit: 3/12/2025       To Whom it May Concern:    Jeannie Dinero was seen virtually on 3/12/2025. She may return to work on 3/17/2025 .    Please excuse her from any classes or work missed.    If you have any questions or concerns, please don't hesitate to call.    Sincerely,         Nano Cain MD

## 2025-03-12 NOTE — PROGRESS NOTES
Subjective:      Patient ID: Jeannie Dinero is a 44 y.o. female.    Vitals:  vitals were not taken for this visit.     Chief Complaint: Sinus Problem      Visit Type: TELE AUDIOVISUAL - This visit was conducted virtually based on  subjective information and limited objective exam    Present with the patient at the time of consultation: TELEMED PRESENT WITH PATIENT: None  LOCATION OF PATIENT [place], [place]  Two patient identifiers used to verify patient- saying out date of birth and full name.       Past Medical History:   Diagnosis Date    Hypertension      Past Surgical History:   Procedure Laterality Date     SECTION      x2     SECTION WITH TUBAL LIGATION      DILATION AND CURETTAGE OF UTERUS      polyps     Review of patient's allergies indicates:  No Known Allergies  Medications Ordered Prior to Encounter[1]  Family History   Problem Relation Name Age of Onset    Breast cancer Paternal Grandmother         Medications Ordered                [facility] [address] [place] [address]   [address] [facility] [address]    Telephone: [phone]   Fax: [phone]   Hours: Not open 24 hours                         E-Prescribed (2 of 2)              amoxicillin-clavulanate 875-125mg (AUGMENTIN) 875-125 mg per tablet    Sig: Take 1 tablet by mouth every 12 (twelve) hours. for 5 days       Start: 3/12/25     Quantity: 10 tablet Refills: 0                         loratadine (CLARITIN) 10 mg tablet    Sig: Take 1 tablet (10 mg total) by mouth once daily.       Start: 3/12/25     Quantity: 30 tablet Refills: 0                           Ohs Peq Odvv Intake    3/12/2025 11:33 AM CDT - Filed by Patient   What is your current physical address in the event of a medical emergency? [facility] [address] [address]   Are you able to take your vital signs? Yes   Systolic Blood Pressure: 140   Diastolic Blood Pressure: 100   Weight: 195   Height: 64   Pulse: 72   Temperature: 97.3   Respiration  rate:    Pulse Oxygen:    Please attach any relevant images or files    Is your employer contracted with [facility]? No         45 yo female with c/o HA, fatigue, SOB on exertion for the past week.   Tested pos FLU A on march 8, completed tamiflu.   Still repots chills, fatigue, post nasal drip, congestion and R sided ear pain that is worsening.           Constitution: Positive for chills, fatigue and generalized weakness. Negative for fever.   HENT:  Positive for ear pain (r ear pressure), congestion, postnasal drip, sinus pressure and sore throat.    Respiratory:  Positive for cough and sputum production.         Objective:   The physical exam was conducted virtually.    AAO x 3 ; no acute distress noted; appearance ill; mood and behavior normal; thought process normal  Head- normocephalic  Nose- congestion, sinus pressure  Eyes- pupils appear normal in size, no drainage, no erythema  Ears- normal appearing; no discharge, no erythema  Mouth- appears normal  Lungs- breathing at a normal rate, no acute distress noted  Heart- no reports of tachycardia, palpitations, chest pain  Abdomen- non distended, non tender reported by patient  Skin- warm and dry, no erythema or edema noted by patient or visualized      Assessment:     1. Otalgia of right ear    2. Sinusitis, unspecified chronicity, unspecified location        Plan:     -Below are suggestions for symptomatic relief:              -Tylenol every 4 hours OR ibuprofen every 6 hours as needed for pain/fever.              -Salt water gargles to soothe throat pain.              -Chloroseptic spray also helps to numb throat pain.              -Nasal saline spray reduces inflammation and dryness.              -Warm face compresses to help with facial sinus pain/pressure.              -Vicks vapor rub at night.              -Flonase OTC or Nasacort OTC for nasal congestion.              -Simple foods like chicken noodle soup.              -Delsym helps with  coughing at night              -Zyrtec/Claritin during the day & Benadryl at night may help with allergies.     If you DO NOT have Hypertension or any history of palpitations, it is ok to take over the counter Sudafed or Mucinex D or Allegra-D or Claritin-D or Zyrtec-D.  If you do take one of the above, it is ok to combine that with plain over the counter Mucinex or Allegra or Claritin or Zyrtec. If, for example, you are taking Zyrtec -D, you can combine that with Mucinex, but not Mucinex-D.  If you are taking Mucinex-D, you can combine that with plain Allegra or Claritin or Zyrtec.   If you DO have Hypertension or palpitations, it is safe to take Coricidin HBP for relief of sinus symptoms.     Please follow up with your Primary care provider within 2-5 days if your signs and symptoms have not resolved or worsen.      If your condition worsens or fails to improve we recommend that you receive another evaluation at the emergency room immediately or contact your primary medical clinic to discuss your concerns.   You must understand that you have received an Urgent Care treatment only and that you may be released before all of your medical problems are known or treated. You, the patient, will arrange for follow up care as instructed.      RED FLAGS/WARNING SYMPTOMS DISCUSSED WITH PATIENT THAT WOULD WARRANT EMERGENT MEDICAL ATTENTION. PATIENT VERBALIZED UNDERSTANDING.       Thank you for choosing [facility]!    Our goal in the [facility] is to always provide outstanding medical care. You may receive a survey by mail or e-mail in the next week regarding your experience today. We would greatly appreciate you completing and returning the survey. Your feedback provides us with a way to recognize our staff who provide very good care, and it helps us learn how to improve when your experience was below our aspiration of excellence.         We appreciate you trusting us with your medical  care. We hope you feel better soon. We will be happy to take care of you for all of your future medical needs.    You must understand that you've received an Urgent Care treatment only and that you may be released before all your medical problems are known or treated. You, the patient, will arrange for follow up care as instructed.    Follow up with your PCP or specialty clinic as directed in the next 1-2 weeks if not improved or as needed.  You can call [phone] to schedule an appointment with the appropriate provider.    If your condition worsens we recommend that you receive another evaluation in person, with your primary care provider, urgent care or at the emergency room immediately or contact your primary medical clinics after hours call service to discuss your concerns.         Otalgia of right ear  -     loratadine (CLARITIN) 10 mg tablet; Take 1 tablet (10 mg total) by mouth once daily.  Dispense: 30 tablet; Refill: 0    Sinusitis, unspecified chronicity, unspecified location  -     amoxicillin-clavulanate 875-125mg (AUGMENTIN) 875-125 mg per tablet; Take 1 tablet by mouth every 12 (twelve) hours. for 5 days  Dispense: 10 tablet; Refill: 0  -     loratadine (CLARITIN) 10 mg tablet; Take 1 tablet (10 mg total) by mouth once daily.  Dispense: 30 tablet; Refill: 0                         [1]   Current Outpatient Medications on File Prior to Visit   Medication Sig Dispense Refill    busPIRone (BUSPAR) 7.5 MG tablet Take 7.5 mg by mouth 2 (two) times daily.      ENTYVIO  mg/0.68 mL PnIj SMARTSI Pre-Filled Pen Syringe SUB-Q Every 2 Weeks      hydroCHLOROthiazide (HYDRODIURIL) 12.5 MG Tab Take 12.5 mg by mouth once daily.      losartan (COZAAR) 100 MG tablet Take 100 mg by mouth.      norethindrone-ethinyl estradiol (JUNEL ) 1 mg-20 mcg (21)/75 mg (7) per tablet Take 1 tablet by mouth once daily. 90 tablet 3    oseltamivir (TAMIFLU) 75 MG capsule Take 1 capsule (75 mg total) by mouth 2 (two)  times daily. for 5 days 10 capsule 0     No current facility-administered medications on file prior to visit.